# Patient Record
Sex: FEMALE | URBAN - METROPOLITAN AREA
[De-identification: names, ages, dates, MRNs, and addresses within clinical notes are randomized per-mention and may not be internally consistent; named-entity substitution may affect disease eponyms.]

---

## 2022-01-31 ENCOUNTER — OFFICE VISIT (OUTPATIENT)
Dept: URBAN - METROPOLITAN AREA CLINIC 3 | Facility: CLINIC | Age: 87
End: 2022-01-31
Payer: COMMERCIAL

## 2022-01-31 DIAGNOSIS — H26.492 OTHER SECONDARY CATARACT, LEFT EYE: ICD-10-CM

## 2022-01-31 DIAGNOSIS — H35.3134 NONEXUDATIVE MACULAR DEGENERATION, ADVANCED ATROPHIC WITH SUBFOVEAL INVOLVEMENT, BILATERAL: ICD-10-CM

## 2022-01-31 PROCEDURE — 99204 OFFICE O/P NEW MOD 45 MIN: CPT | Performed by: OPHTHALMOLOGY

## 2022-01-31 ASSESSMENT — INTRAOCULAR PRESSURE
OD: 18
OS: 18

## 2022-01-31 NOTE — IMPRESSION/PLAN
Impression: Age-related cataract, morganian type, right eye: H25.21. 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 Discussed the risks, benefits, and alternatives of cataract surgery. Advised against driving. The patient stated a full understanding and a desire to proceed with the procedure. Reviewed the increased risk of retinal detachment after cataract surgery and reviewed retinal detachment and general warnings and to contact us immediately should any of those develop. Patient stated an understanding of all the instructions. Limited view with no apparent mass or retinal detachment. Patient will need a Monofocal yellow IOL Dry AMD advanced OU- discussed limitation to vision after cataract surgery.

## 2022-01-31 NOTE — IMPRESSION/PLAN
Impression: Nonexudative macular degeneration, advanced atrophic with subfoveal involvement, bilateral: H35.3134. Plan: Macular degeneration, dry type, appears progressed with decreased vision. Discussed diagnosis in detail with patient. Will continue to observe condition and or symptoms.

## 2022-01-31 NOTE — IMPRESSION/PLAN
Impression: Other secondary cataract, left eye: H26.492. Plan: Posterior capsular opacity YAG OS only - the risks, benefits, and alternatives of YAG laser capsulotomy were discussed with the patient, who stated an understanding of the above, having all questions answered, and a desire to proceed with the procedure.

## 2022-02-15 ENCOUNTER — TESTING ONLY (OUTPATIENT)
Dept: URBAN - METROPOLITAN AREA CLINIC 3 | Facility: CLINIC | Age: 87
End: 2022-02-15
Payer: COMMERCIAL

## 2022-02-15 DIAGNOSIS — H25.21 AGE-RELATED CATARACT, MORGAGNIAN TYPE, RIGHT EYE: Primary | ICD-10-CM
